# Patient Record
Sex: FEMALE | Race: WHITE | NOT HISPANIC OR LATINO | Employment: OTHER | URBAN - METROPOLITAN AREA
[De-identification: names, ages, dates, MRNs, and addresses within clinical notes are randomized per-mention and may not be internally consistent; named-entity substitution may affect disease eponyms.]

---

## 2019-05-06 ENCOUNTER — OFFICE VISIT (OUTPATIENT)
Dept: ORTHOPEDICS | Facility: CLINIC | Age: 64
End: 2019-05-06
Payer: MEDICARE

## 2019-05-06 VITALS — WEIGHT: 126.69 LBS | HEIGHT: 61 IN | BODY MASS INDEX: 23.92 KG/M2

## 2019-05-06 DIAGNOSIS — M65.331 TRIGGER MIDDLE FINGER OF RIGHT HAND: Primary | ICD-10-CM

## 2019-05-06 PROCEDURE — 99213 PR OFFICE/OUTPT VISIT, EST, LEVL III, 20-29 MIN: ICD-10-PCS | Mod: S$GLB,,, | Performed by: ORTHOPAEDIC SURGERY

## 2019-05-06 PROCEDURE — 99213 OFFICE O/P EST LOW 20 MIN: CPT | Mod: S$GLB,,, | Performed by: ORTHOPAEDIC SURGERY

## 2019-05-06 RX ORDER — SIMVASTATIN 20 MG/1
TABLET, FILM COATED ORAL
COMMUNITY

## 2019-05-06 RX ORDER — ALENDRONATE SODIUM 70 MG/1
TABLET ORAL
Refills: 6 | COMMUNITY
Start: 2019-02-16 | End: 2023-02-14

## 2019-05-06 RX ORDER — SOLIFENACIN SUCCINATE 5 MG/1
TABLET, FILM COATED ORAL
COMMUNITY
End: 2023-02-14

## 2019-05-06 RX ORDER — BENZONATATE 200 MG/1
CAPSULE ORAL
COMMUNITY
End: 2023-02-14

## 2019-05-06 RX ORDER — POLYETHYLENE GLYCOL 3350, SODIUM SULFATE ANHYDROUS, SODIUM BICARBONATE, SODIUM CHLORIDE, POTASSIUM CHLORIDE 236; 22.74; 6.74; 5.86; 2.97 G/4L; G/4L; G/4L; G/4L; G/4L
POWDER, FOR SOLUTION ORAL
COMMUNITY
End: 2023-02-14

## 2019-05-06 RX ORDER — DOXYCYCLINE 100 MG/1
CAPSULE ORAL
COMMUNITY
End: 2023-02-14

## 2019-05-06 RX ORDER — PROMETHAZINE HYDROCHLORIDE AND CODEINE PHOSPHATE 6.25; 1 MG/5ML; MG/5ML
SOLUTION ORAL
COMMUNITY
End: 2023-02-14

## 2019-05-06 RX ORDER — LEVOTHYROXINE SODIUM 75 UG/1
TABLET ORAL
COMMUNITY
Start: 2019-05-06 | End: 2021-07-23

## 2019-05-06 RX ORDER — HYDROCODONE BITARTRATE AND ACETAMINOPHEN 5; 325 MG/1; MG/1
TABLET ORAL
COMMUNITY
End: 2023-02-14

## 2019-05-06 RX ORDER — METHYLPREDNISOLONE 4 MG/1
TABLET ORAL
COMMUNITY
End: 2023-02-14

## 2019-05-06 RX ORDER — MELOXICAM 15 MG/1
TABLET ORAL
COMMUNITY
Start: 2019-05-06 | End: 2019-10-29 | Stop reason: SDUPTHER

## 2019-05-06 NOTE — PROGRESS NOTES
Subjective:      Patient ID: Araceli Dawkins is a 64 y.o. female.    Chief Complaint: Pain of the Right Hand    HPI patient comes in to schedule right middle finger trigger finger release.  She only got about 2 months relief from her prior injection    Review of Systems   Constitution: Negative for fever and weight loss.   Cardiovascular: Negative for chest pain and leg swelling.   Musculoskeletal: Positive for stiffness. Negative for arthritis, joint pain, joint swelling and muscle weakness.   Gastrointestinal: Negative for change in bowel habit.   Genitourinary: Negative for bladder incontinence and hematuria.   Neurological: Negative for focal weakness, numbness, paresthesias and sensory change.         Objective:                        Right Hand/Wrist Exam     Comments:  Examination of the right hand shows no deformity.  There is no mass.    Active and passive range of motion of the right middle finger is normal.  She is tender with palpation of the region of the A1 pulley.  She has a palpable nodule and mild triggering                    Assessment:       Encounter Diagnosis   Name Primary?    Trigger middle finger of right hand Yes          Plan:       Araceli was seen today for pain.    Diagnoses and all orders for this visit:    Trigger middle finger of right hand      She is ready to schedule release of the A1 pulley of the right middle finger

## 2019-05-29 ENCOUNTER — OUTSIDE PLACE OF SERVICE (OUTPATIENT)
Dept: ORTHOPEDICS | Facility: CLINIC | Age: 64
End: 2019-05-29
Payer: MEDICARE

## 2019-05-29 PROCEDURE — 26055 INCISE FINGER TENDON SHEATH: CPT | Mod: F7,,, | Performed by: ORTHOPAEDIC SURGERY

## 2019-05-29 PROCEDURE — 26055 PR INCISE FINGER TENDON SHEATH: ICD-10-PCS | Mod: F7,,, | Performed by: ORTHOPAEDIC SURGERY

## 2019-06-07 ENCOUNTER — OFFICE VISIT (OUTPATIENT)
Dept: ORTHOPEDICS | Facility: CLINIC | Age: 64
End: 2019-06-07
Payer: MEDICARE

## 2019-06-07 DIAGNOSIS — M65.331 TRIGGER MIDDLE FINGER OF RIGHT HAND: Primary | ICD-10-CM

## 2019-06-07 PROCEDURE — 99024 PR POST-OP FOLLOW-UP VISIT: ICD-10-PCS | Mod: S$GLB,POP,, | Performed by: ORTHOPAEDIC SURGERY

## 2019-06-07 PROCEDURE — 99024 POSTOP FOLLOW-UP VISIT: CPT | Mod: S$GLB,POP,, | Performed by: ORTHOPAEDIC SURGERY

## 2019-06-07 NOTE — PROGRESS NOTES
Subjective:      Patient ID: Araceli Dawkins is a 64 y.o. female.    Chief Complaint: Post-op Evaluation of the Right Hand    HPI patient is 10 days postop right middle finger trigger finger release.  She complains of an occasional popping sensation.  She does not have true triggering  ROS    unchanged from prior visit  Objective:            Ortho/SPM Exam  Active and passive range of motion is normal.  She is tender with palpation of the incision site        Assessment:       Encounter Diagnosis   Name Primary?    Trigger middle finger of right hand Yes          Plan:       Araceli was seen today for post-op evaluation.    Diagnoses and all orders for this visit:    Trigger middle finger of right hand

## 2019-10-30 RX ORDER — MELOXICAM 15 MG/1
TABLET ORAL
Qty: 90 TABLET | Refills: 3 | Status: SHIPPED | OUTPATIENT
Start: 2019-10-30 | End: 2023-02-14

## 2021-07-23 ENCOUNTER — OFFICE VISIT (OUTPATIENT)
Dept: ORTHOPEDICS | Facility: CLINIC | Age: 66
End: 2021-07-23
Payer: MEDICARE

## 2021-07-23 DIAGNOSIS — M70.61 GREATER TROCHANTERIC BURSITIS OF RIGHT HIP: Primary | ICD-10-CM

## 2021-07-23 PROCEDURE — 99213 PR OFFICE/OUTPT VISIT, EST, LEVL III, 20-29 MIN: ICD-10-PCS | Mod: 25,S$GLB,, | Performed by: ORTHOPAEDIC SURGERY

## 2021-07-23 PROCEDURE — 20610 LARGE JOINT ASPIRATION/INJECTION: R GREATER TROCHANTERIC BURSA: ICD-10-PCS | Mod: RT,S$GLB,, | Performed by: ORTHOPAEDIC SURGERY

## 2021-07-23 PROCEDURE — 99213 OFFICE O/P EST LOW 20 MIN: CPT | Mod: 25,S$GLB,, | Performed by: ORTHOPAEDIC SURGERY

## 2021-07-23 PROCEDURE — 20610 DRAIN/INJ JOINT/BURSA W/O US: CPT | Mod: RT,S$GLB,, | Performed by: ORTHOPAEDIC SURGERY

## 2021-07-23 RX ORDER — TRIAMCINOLONE ACETONIDE 40 MG/ML
20 INJECTION, SUSPENSION INTRA-ARTICULAR; INTRAMUSCULAR
Status: DISCONTINUED | OUTPATIENT
Start: 2021-07-23 | End: 2021-07-23 | Stop reason: HOSPADM

## 2021-07-23 RX ORDER — LEVOTHYROXINE SODIUM 88 UG/1
88 TABLET ORAL DAILY
COMMUNITY
Start: 2021-06-28 | End: 2023-02-14

## 2021-07-23 RX ADMIN — TRIAMCINOLONE ACETONIDE 20 MG: 40 INJECTION, SUSPENSION INTRA-ARTICULAR; INTRAMUSCULAR at 08:07

## 2022-03-29 DIAGNOSIS — M25.562 LEFT KNEE PAIN: Primary | ICD-10-CM

## 2022-03-31 ENCOUNTER — OFFICE VISIT (OUTPATIENT)
Dept: ORTHOPEDICS | Facility: CLINIC | Age: 67
End: 2022-03-31
Payer: MEDICARE

## 2022-03-31 DIAGNOSIS — M25.562 ACUTE PAIN OF LEFT KNEE: Primary | ICD-10-CM

## 2022-03-31 DIAGNOSIS — M25.562 LEFT KNEE PAIN: ICD-10-CM

## 2022-03-31 PROCEDURE — 99213 PR OFFICE/OUTPT VISIT, EST, LEVL III, 20-29 MIN: ICD-10-PCS | Mod: S$GLB,,, | Performed by: ORTHOPAEDIC SURGERY

## 2022-03-31 PROCEDURE — 99213 OFFICE O/P EST LOW 20 MIN: CPT | Mod: S$GLB,,, | Performed by: ORTHOPAEDIC SURGERY

## 2022-03-31 RX ORDER — MELOXICAM 15 MG/1
15 TABLET ORAL DAILY
Qty: 60 TABLET | Refills: 5 | Status: SHIPPED | OUTPATIENT
Start: 2022-03-31 | End: 2023-02-14

## 2022-03-31 NOTE — PROGRESS NOTES
Subjective:      Patient ID: Araceli Dawkins is a 67 y.o. female.    Chief Complaint: Pain of the Left Knee    HPI 67-year-old lady comes in with acute onset of left knee pain over the past 3-4 weeks.  She denies any history of trauma.  She is not having any symptoms on today's visit.  She gets relief from ibuprofen    Review of Systems   Constitutional: Negative for fever and weight loss.   Cardiovascular: Negative for chest pain and leg swelling.   Musculoskeletal: Positive for arthritis and joint pain. Negative for joint swelling, muscle weakness and stiffness.   Gastrointestinal: Negative for change in bowel habit.   Genitourinary: Negative for bladder incontinence and hematuria.   Neurological: Negative for focal weakness, numbness, paresthesias and sensory change.         Objective:      Active and passive range of motion of the left knee is normal.  She has mild varus alignment on standing.  There is no effusion or pathologic laxity.  She has normal sensation and pulses.      Ortho/SPM Exam            Assessment:       Encounter Diagnoses   Name Primary?    Acute pain of left knee Yes    Left knee pain           Plan:       Araceli was seen today for pain.    Diagnoses and all orders for this visit:    Acute pain of left knee  -     X-Ray Knee 1 or 2 View Left; Future    Left knee pain  -     Ambulatory referral/consult to Orthopedics    X-ray of the left knee shows mild subchondral sclerosis beneath the medial tibial plateau and very mild narrowing of the medial articular cartilage space.  She is a Kellgren Bhavesh grade 1. She is placed on meloxicam to take p.r.n..  Return p.r.n.

## 2022-04-25 ENCOUNTER — OFFICE VISIT (OUTPATIENT)
Dept: ORTHOPEDICS | Facility: CLINIC | Age: 67
End: 2022-04-25
Payer: MEDICARE

## 2022-04-25 DIAGNOSIS — S83.242D ACUTE MEDIAL MENISCUS TEAR OF LEFT KNEE, SUBSEQUENT ENCOUNTER: ICD-10-CM

## 2022-04-25 DIAGNOSIS — M25.562 ACUTE PAIN OF LEFT KNEE: Primary | ICD-10-CM

## 2022-04-25 PROCEDURE — 99499 NO LOS: ICD-10-PCS | Mod: S$GLB,,, | Performed by: ORTHOPAEDIC SURGERY

## 2022-04-25 PROCEDURE — 99499 UNLISTED E&M SERVICE: CPT | Mod: S$GLB,,, | Performed by: ORTHOPAEDIC SURGERY

## 2022-04-25 NOTE — PROGRESS NOTES
Subjective:      Patient ID: Araceli Dawkins is a 67 y.o. female.    Chief Complaint: Pain of the Left Knee    HPI patient comes in for recheck on her left knee.  She states she is no better and may in fact be worse.  She complains of catching popping in swelling in her knee.  She  especially has difficulty arising from a sitting position.    ROS unchanged from prior visit      Objective:        Unchanged from prior visit  Ortho/SPM Exam            Assessment:       Encounter Diagnoses   Name Primary?    Acute pain of left knee Yes    Acute medial meniscus tear of left knee, subsequent encounter           Plan:       Araceli was seen today for pain.    Diagnoses and all orders for this visit:    Acute pain of left knee    Acute medial meniscus tear of left knee, subsequent encounter  -     MRI Knee Without Contrast Left; Future  -     MRI Knee Without Contrast Left      Recommend MRI of the left knee.  She has a previous history of meniscal pathology on the right side.

## 2022-04-26 ENCOUNTER — TELEPHONE (OUTPATIENT)
Dept: ORTHOPEDICS | Facility: CLINIC | Age: 67
End: 2022-04-26
Payer: MEDICARE

## 2022-04-26 NOTE — TELEPHONE ENCOUNTER
920    4/26/22      Spoke with patient.  Told her that her MRI has just gotten approved which is why it has not been sent over yet. Told her we would send it over today.

## 2022-04-26 NOTE — TELEPHONE ENCOUNTER
----- Message from Vanesa Pinto sent at 4/26/2022  8:44 AM CDT -----  Araceli Dawkins would like for the nurse to give her a call back with an update on her mri orders.     She said she spoke with someone in the office earlier and was told where her mri orders were sent but she called the imaging clinic and they said they do not have anything.     Please give her a call back at [phone]

## 2022-04-28 ENCOUNTER — OFFICE VISIT (OUTPATIENT)
Dept: ORTHOPEDICS | Facility: CLINIC | Age: 67
End: 2022-04-28
Payer: MEDICARE

## 2022-04-28 DIAGNOSIS — S83.242D ACUTE MEDIAL MENISCUS TEAR OF LEFT KNEE, SUBSEQUENT ENCOUNTER: Primary | ICD-10-CM

## 2022-04-28 PROCEDURE — 99499 UNLISTED E&M SERVICE: CPT | Mod: S$GLB,,, | Performed by: ORTHOPAEDIC SURGERY

## 2022-04-28 PROCEDURE — 99499 NO LOS: ICD-10-PCS | Mod: S$GLB,,, | Performed by: ORTHOPAEDIC SURGERY

## 2022-04-28 RX ORDER — TRAMADOL HYDROCHLORIDE 50 MG/1
50 TABLET ORAL EVERY 8 HOURS PRN
Qty: 21 TABLET | Refills: 0 | Status: SHIPPED | OUTPATIENT
Start: 2022-04-28

## 2022-04-28 NOTE — PROGRESS NOTES
Subjective:      Patient ID: Araceli Dawkins is a 67 y.o. female.    Chief Complaint: Pain of the Left Knee and Follow-up    HPI patient comes in today for review of her MRI results on the left knee.  It shows a tear of the posterior horn and body of the medial meniscus.    ROS unchanged from prior visit      Objective:      Unchanged from prior visit      Ortho/SPM Exam            Assessment:       Encounter Diagnosis   Name Primary?    Acute medial meniscus tear of left knee, subsequent encounter Yes          Plan:       Araceli was seen today for follow-up and pain.    Diagnoses and all orders for this visit:    Acute medial meniscus tear of left knee, subsequent encounter    She is ready to proceed with arthroscopic meniscal shaving.  We will do this in the near future at her convenience.  She has had the same procedure done on the right side and is aware of the advantages and risks of the procedure.

## 2022-05-11 LAB
ANION GAP SERPL CALC-SCNC: 7 MMOL/L (ref 3–11)
BASOPHILS NFR BLD: 0.3 % (ref 0–3)
BUN SERPL-MCNC: 22 MG/DL (ref 7–18)
BUN/CREAT SERPL: 29.33 RATIO (ref 7–18)
CALCIUM SERPL-MCNC: 8.9 MG/DL (ref 8.8–10.5)
CHLORIDE SERPL-SCNC: 102 MMOL/L (ref 100–108)
CO2 SERPL-SCNC: 31 MMOL/L (ref 21–32)
CREAT SERPL-MCNC: 0.75 MG/DL (ref 0.55–1.02)
EOSINOPHIL NFR BLD: 1.6 % (ref 1–3)
ERYTHROCYTE [DISTWIDTH] IN BLOOD BY AUTOMATED COUNT: 13.2 % (ref 12.5–18)
GFR ESTIMATION: > 60
GLUCOSE SERPL-MCNC: 119 MG/DL (ref 70–110)
HCT VFR BLD AUTO: 40.3 % (ref 37–47)
HGB BLD-MCNC: 13.2 G/DL (ref 12–16)
LYMPHOCYTES NFR BLD: 23.1 % (ref 25–40)
MCH RBC QN AUTO: 29.1 PG (ref 27–31.2)
MCHC RBC AUTO-ENTMCNC: 32.8 G/DL (ref 31.8–35.4)
MCV RBC AUTO: 89 FL (ref 80–97)
MONOCYTES NFR BLD: 7.9 % (ref 1–15)
NEUTROPHILS # BLD AUTO: 5.97 10*3/UL (ref 1.8–7.7)
NEUTROPHILS NFR BLD: 66.4 % (ref 37–80)
NUCLEATED RED BLOOD CELLS: 0 %
PLATELETS: 213 10*3/UL (ref 142–424)
POTASSIUM SERPL-SCNC: 4 MMOL/L (ref 3.6–5.2)
RBC # BLD AUTO: 4.53 10*6/UL (ref 4.2–5.4)
SODIUM BLD-SCNC: 140 MMOL/L (ref 135–145)
WBC # BLD: 9 10*3/UL (ref 4.6–10.2)

## 2022-05-12 ENCOUNTER — TELEPHONE (OUTPATIENT)
Dept: ORTHOPEDICS | Facility: CLINIC | Age: 67
End: 2022-05-12
Payer: MEDICARE

## 2022-05-12 NOTE — TELEPHONE ENCOUNTER
----- Message from Mandi Watts sent at 5/12/2022  8:06 AM CDT -----  Regarding: pt advice  Pt states that she would like to speak to nurse. States that she is having a procedure next week and is having some neck problems and wants to know if that is something that needs to be addressed before her procedure. Please call back at [phone]//thank you acc

## 2022-05-12 NOTE — TELEPHONE ENCOUNTER
828    5/12/22    Spoke with patient.  She is going out of town tomorrow and was concerned about her neck pain and knee surgery for next week. Told her to ice neck, reschedule if necessary. Surgery is still scheduled.

## 2022-05-18 ENCOUNTER — OUTSIDE PLACE OF SERVICE (OUTPATIENT)
Dept: ADMINISTRATIVE | Facility: OTHER | Age: 67
End: 2022-05-18
Payer: MEDICARE

## 2022-05-18 PROCEDURE — 29881 PR KNEE SCOPE SINGLE MENISECECTOMY: ICD-10-PCS | Mod: LT,,, | Performed by: ORTHOPAEDIC SURGERY

## 2022-05-18 PROCEDURE — 29881 ARTHRS KNE SRG MNISECTMY M/L: CPT | Mod: LT,,, | Performed by: ORTHOPAEDIC SURGERY

## 2022-05-26 ENCOUNTER — OFFICE VISIT (OUTPATIENT)
Dept: ORTHOPEDICS | Facility: CLINIC | Age: 67
End: 2022-05-26
Payer: MEDICARE

## 2022-05-26 DIAGNOSIS — S83.242D ACUTE MEDIAL MENISCUS TEAR OF LEFT KNEE, SUBSEQUENT ENCOUNTER: Primary | ICD-10-CM

## 2022-05-26 PROCEDURE — 99024 PR POST-OP FOLLOW-UP VISIT: ICD-10-PCS | Mod: S$GLB,POP,, | Performed by: ORTHOPAEDIC SURGERY

## 2022-05-26 PROCEDURE — 99024 POSTOP FOLLOW-UP VISIT: CPT | Mod: S$GLB,POP,, | Performed by: ORTHOPAEDIC SURGERY

## 2022-05-26 NOTE — PROGRESS NOTES
Subjective:      Patient ID: Araceli Dawkins is a 67 y.o. female.    Chief Complaint: Post-op Evaluation of the Left Knee    HPI patient is a week postop left knee arthroscopic meniscal shaving.  She has mild swelling of the lateral portal otherwise she complains of occasional popping in the knee.    ROS unchanged from prior visit      Objective:      Incisions are clean.  There is no drainage.  She has mild swelling of the lateral portal.  She has normal range of motion of the knee.      Ortho/SPM Exam            Assessment:       Encounter Diagnosis   Name Primary?    Acute medial meniscus tear of left knee, subsequent encounter Yes          Plan:       Araceli was seen today for post-op evaluation.    Diagnoses and all orders for this visit:    Acute medial meniscus tear of left knee, subsequent encounter    Sutures are removed today.  She is encouraged in massaging her scars.  Arrangements were made for a brief course of therapy.  Return p.r.n.

## 2022-07-11 ENCOUNTER — OFFICE VISIT (OUTPATIENT)
Dept: ORTHOPEDICS | Facility: CLINIC | Age: 67
End: 2022-07-11
Payer: MEDICARE

## 2022-07-11 DIAGNOSIS — M17.12 PRIMARY OSTEOARTHRITIS OF LEFT KNEE: Primary | ICD-10-CM

## 2022-07-11 PROCEDURE — 99024 PR POST-OP FOLLOW-UP VISIT: ICD-10-PCS | Mod: S$GLB,,, | Performed by: ORTHOPAEDIC SURGERY

## 2022-07-11 PROCEDURE — 99024 POSTOP FOLLOW-UP VISIT: CPT | Mod: S$GLB,,, | Performed by: ORTHOPAEDIC SURGERY

## 2022-07-11 NOTE — PROGRESS NOTES
Subjective:      Patient ID: Araceli Dawkins is a 67 y.o. female.    Chief Complaint: Pain of the Left Knee    HPI 67-year-old lady with left knee pain status post arthroscopic meniscal shaving of her medial meniscal tear.  The pain is medial.  She has rest pain as well.    Review of Systems   Constitutional: Negative for fever and weight loss.   Cardiovascular: Negative for chest pain and leg swelling.   Musculoskeletal: Positive for arthritis and joint pain. Negative for joint swelling, muscle weakness and stiffness.   Gastrointestinal: Negative for change in bowel habit.   Genitourinary: Negative for bladder incontinence and hematuria.   Neurological: Negative for focal weakness, numbness, paresthesias and sensory change.         Objective:      Range of motion of the left knee is normal.  She has normal alignment on standing.  She is tender with palpation of the medial joint line, medial tibial metaphysis, and medial femoral condyle.  She has no effusion or pathologic laxity.  She has no pain with valgus stress.      Ortho/SPM Exam            Assessment:       Encounter Diagnosis   Name Primary?    Primary osteoarthritis of left knee Yes          Plan:       Araceli was seen today for pain.    Diagnoses and all orders for this visit:    Primary osteoarthritis of left knee    She is given information on viscosupplementation injections.  Return 3 weeks p.r.n.

## 2022-09-29 ENCOUNTER — OFFICE VISIT (OUTPATIENT)
Dept: ORTHOPEDICS | Facility: CLINIC | Age: 67
End: 2022-09-29
Payer: MEDICARE

## 2022-09-29 DIAGNOSIS — M65.341 TRIGGER RING FINGER OF RIGHT HAND: Primary | ICD-10-CM

## 2022-09-29 PROCEDURE — 99212 PR OFFICE/OUTPT VISIT, EST, LEVL II, 10-19 MIN: ICD-10-PCS | Mod: S$GLB,,, | Performed by: ORTHOPAEDIC SURGERY

## 2022-09-29 PROCEDURE — 99212 OFFICE O/P EST SF 10 MIN: CPT | Mod: S$GLB,,, | Performed by: ORTHOPAEDIC SURGERY

## 2022-09-29 RX ORDER — METHYLPREDNISOLONE 4 MG/1
TABLET ORAL
Qty: 21 EACH | Refills: 0 | Status: SHIPPED | OUTPATIENT
Start: 2022-09-29 | End: 2022-10-20

## 2022-09-29 NOTE — PROGRESS NOTES
Subjective:      Patient ID: Araceli Dawkins is a 67 y.o. female.    Chief Complaint: Pain of the Right Hand and Pain of the Left Hand    HPI 67-year-old lady comes in with triggering of the right ring finger and left index finger.  Her symptoms come and go.    ROS noncontributory      Objective:        Active and passive range of motion of both fingers is normal.  She has a mildly tender nodule in the region of the A1 pulley.  There is no triggering on today's visit.    Ortho/SPM Exam            Assessment:       Encounter Diagnosis   Name Primary?    Trigger ring finger of right hand Yes          Plan:       Araceli was seen today for pain and pain.    Diagnoses and all orders for this visit:    Trigger ring finger of right hand    She is placed on a Medrol Dosepak.  Return p.r.n.

## 2022-11-14 ENCOUNTER — OFFICE VISIT (OUTPATIENT)
Dept: ORTHOPEDICS | Facility: CLINIC | Age: 67
End: 2022-11-14
Payer: MEDICARE

## 2022-11-14 DIAGNOSIS — M65.332 TRIGGER FINGER, LEFT MIDDLE FINGER: ICD-10-CM

## 2022-11-14 DIAGNOSIS — M65.341 TRIGGER RING FINGER OF RIGHT HAND: Primary | ICD-10-CM

## 2022-11-14 PROCEDURE — 99499 UNLISTED E&M SERVICE: CPT | Mod: S$GLB,,, | Performed by: ORTHOPAEDIC SURGERY

## 2022-11-14 PROCEDURE — 20550 TENDON SHEATH: ICD-10-PCS | Mod: 50,S$GLB,, | Performed by: ORTHOPAEDIC SURGERY

## 2022-11-14 PROCEDURE — 20550 NJX 1 TENDON SHEATH/LIGAMENT: CPT | Mod: 50,S$GLB,, | Performed by: ORTHOPAEDIC SURGERY

## 2022-11-14 PROCEDURE — 99499 NO LOS: ICD-10-PCS | Mod: S$GLB,,, | Performed by: ORTHOPAEDIC SURGERY

## 2022-11-14 NOTE — PROGRESS NOTES
Subjective:      Patient ID: Araceli Dawkins is a 67 y.o. female.    Chief Complaint: Pain of the Right Hand    HPI 67-year-old lady comes in with triggering of the right ring and left middle fingers.    ROS noncontributory      Objective:      Active and passive range of motion of the fingers is normal.  She has a tender nodule in the region of the A1 pulley of the right ring and left middle fingers.      Ortho/SPM Exam            Assessment:       Encounter Diagnoses   Name Primary?    Trigger ring finger of right hand Yes    Trigger finger, left middle finger           Plan:       Araceli was seen today for pain.    Diagnoses and all orders for this visit:    Trigger ring finger of right hand    Trigger finger, left middle finger      Both fingers are injected today.  Return p.r.n.

## 2022-11-14 NOTE — PROCEDURES
Tendon Sheath    Date/Time: 11/14/2022 3:00 PM  Performed by: Marquis Guzman MD  Authorized by: Marquis Guzman MD     Prep: patient was prepped and draped in usual sterile fashion      Local anesthesia used?: No    Location:  Ring finger  Site:  R ring flexor tendon sheath  Needle size:  27 G  Approach:  Volar  Medications:  5 mg triamcinolone acetonide 10 mg/mL  Tendon Sheath    Date/Time: 11/14/2022 3:00 PM  Performed by: Marquis Guzman MD  Authorized by: Marquis Guzman MD     Prep: patient was prepped and draped in usual sterile fashion      Local anesthesia used?: No    Location:  Long finger  Site:  L long flexor tendon sheath  Needle size:  27 G  Approach:  Volar  Medications:  5 mg triamcinolone acetonide 10 mg/mL  Patient tolerance:  Patient tolerated the procedure well with no immediate complications

## 2023-02-14 ENCOUNTER — OFFICE VISIT (OUTPATIENT)
Dept: ORTHOPEDICS | Facility: CLINIC | Age: 68
End: 2023-02-14
Payer: MEDICARE

## 2023-02-14 DIAGNOSIS — M25.462 EFFUSION, LEFT KNEE: Primary | ICD-10-CM

## 2023-02-14 PROCEDURE — 99212 PR OFFICE/OUTPT VISIT, EST, LEVL II, 10-19 MIN: ICD-10-PCS | Mod: S$GLB,,, | Performed by: ORTHOPAEDIC SURGERY

## 2023-02-14 PROCEDURE — 99212 OFFICE O/P EST SF 10 MIN: CPT | Mod: S$GLB,,, | Performed by: ORTHOPAEDIC SURGERY

## 2023-02-14 RX ORDER — LEVOTHYROXINE SODIUM 100 UG/1
100 TABLET ORAL
COMMUNITY
Start: 2022-12-21

## 2023-02-14 NOTE — PROGRESS NOTES
Subjective:      Patient ID: Araceli Dawkins is a 68 y.o. female.    Chief Complaint: Pain of the Left Knee    HPI 68-year-old lady comes in with left knee pain.  She is status post arthroscopic meniscal shaving in this knee.    Review of Systems   Constitutional: Negative for fever and weight loss.   Cardiovascular:  Negative for chest pain and leg swelling.   Musculoskeletal:  Positive for arthritis, joint pain, joint swelling and stiffness. Negative for muscle weakness.   Gastrointestinal:  Negative for change in bowel habit.   Genitourinary:  Negative for bladder incontinence and hematuria.   Neurological:  Negative for focal weakness, numbness, paresthesias and sensory change.       Objective:      Range motion of the knee is normal.  She has an effusion.  She has no pathologic laxity.  She has mild varus alignment.  She has normal sensation and pulses and a grade 5/5 strength to manual testing.      Ortho/SPM Exam            Assessment:       Encounter Diagnosis   Name Primary?    Effusion, left knee Yes          Plan:       Araceli was seen today for pain.    Diagnoses and all orders for this visit:    Effusion, left knee  -     X-Ray Knee 1 or 2 View Left; Future    Aspiration is offered and she defers.  Recommend rest and ice.  Return p.r.n.     AP and lateral the left knee shows near complete loss of the medial articular cartilage space.  She is a Kellgren Bhavesh grade 3

## 2023-02-23 ENCOUNTER — TELEPHONE (OUTPATIENT)
Dept: ORTHOPEDICS | Facility: CLINIC | Age: 68
End: 2023-02-23
Payer: MEDICARE

## 2023-02-23 NOTE — TELEPHONE ENCOUNTER
----- Message from Ned Shaffer sent at 2/23/2023 11:12 AM CST -----  Contact: self  Type:  Patient Returning Call    Who Called:Araceli Dawkins  Who Left Message for Patient:Yessi  Does the patient know what this is regarding?:Dr Thomas emmanuel -- and to get medical records  Would the patient rather a call back or a response via [facility]? Call back  Best Call Back Number:[phone]  Additional Information: n/a

## 2023-02-23 NOTE — TELEPHONE ENCOUNTER
----- Message from Brea Robison sent at 2/23/2023  9:46 AM CST -----  Contact: Pt  States she's calling to discuss how she can get her records from when she started seeing Dr Guzman previous to him coming into the [facility] system and can be reached at [phone] or pt portal//thanks/angélica     Goes back to June 2019 and she's been seeing the Dr longer than that

## 2023-02-23 NOTE — TELEPHONE ENCOUNTER
2/23/23  Spoke w/ patient    Records from 2/2019 are ready for patient.  She will come in tomorrow to sign release form.  She is given the phone number to contact [facility] for records before 2019.